# Patient Record
(demographics unavailable — no encounter records)

---

## 2025-07-02 NOTE — HISTORY OF PRESENT ILLNESS
[Patient reported mammogram was normal] : Patient reported mammogram was normal [Patient reported PAP Smear was normal] : Patient reported PAP Smear was normal [Condoms] : uses condoms [Y] : Patient is sexually active [Monogamous (Male Partner)] : is monogamous with a male partner [FreeTextEntry1] : 41 yo P2 here for annual  h/o ascus and didelphys uterus lmp 6/27 menses normal, not heavy  h/o ivf, sexually active with , condoms no hot flashes or night sweats no gyn complaints [Mammogramdate] : 10/2024 [PapSmeardate] : 2024 [LMPDate] : 6/27/25

## 2025-07-02 NOTE — DISCUSSION/SUMMARY
[FreeTextEntry1] : 40 yo  here for annual gyn visit - HCM pap collected. rx mammo given. condoms. SBE. diet and exercise f/u 1 year

## 2025-07-02 NOTE — PHYSICAL EXAM
[TextEntry] : General appearance well-appearing no acute distress  Breast examined in the upright and supine position.  Breast exam within normal limits, no masses no lymphadenopathy nontender bilaterally  Normal external genitalia  Speculum inserted normal-appearing vagina no lesions no abnormal discharge cervix appears closed no lesions. Pap collected  Bimanual exam performed. Anteverted slightly enlarged uterus, no discrete masses, nontender no cervical motion tenderness no adnexal masses bilaterally, no adnexal tenderness bilaterally

## 2025-07-07 NOTE — HISTORY OF PRESENT ILLNESS
[FreeTextEntry1] : CPE [de-identified] : 40 y/o F PMHX of Hashimoto's thyroiditis presents for yearly physical. Feels well. No acute complaints today.   Recent F/u with OB/GYN on 7/2/2025 where she had Pap smear done. Still Awaiting results. Otherwise UTD on all screening exams

## 2025-07-07 NOTE — ASSESSMENT
[Vaccines Reviewed] : Immunizations reviewed today. Please see immunization details in the vaccine log within the immunization flowsheet.  [FreeTextEntry1] : 42 y/o F PMHX of Hashimoto's thyroiditis presents for yearly physical.  #HCM - Vitals WNL. EKG NSR - Screenings UTD. Gyn appointment on 7/2. Pap Done 7/2/2025 and Mammogram done 10/2024 WNL - PHQ negative - TDAP in 2018. UTD - Routine Lab work: CBC, CMP, Lipids, A1c, TSH  #Hashimoto's - Stable on 100 mcg dose synthroid - f/u TSH reflex T4 - States she wishes to see what her TSH results are before refilling her Synthroid.

## 2025-07-07 NOTE — HEALTH RISK ASSESSMENT
[Yes] : Yes [2 - 4 times a month (2 pts)] : 2-4 times a month (2 points) [1 or 2 (0 pts)] : 1 or 2 (0 points) [Never (0 pts)] : Never (0 points) [No] : In the past 12 months have you used drugs other than those required for medical reasons? No [No falls in past year] : Patient reported no falls in the past year [0] : 2) Feeling down, depressed, or hopeless: Not at all (0) [PHQ-2 Negative - No further assessment needed] : PHQ-2 Negative - No further assessment needed [Never] : Never [Patient reported mammogram was normal] : Patient reported mammogram was normal [With Family] : lives with family [] :  [Sexually Active] : sexually active [Feels Safe at Home] : Feels safe at home [Fully functional (bathing, dressing, toileting, transferring, walking, feeding)] : Fully functional (bathing, dressing, toileting, transferring, walking, feeding) [Fully functional (using the telephone, shopping, preparing meals, housekeeping, doing laundry, using] : Fully functional and needs no help or supervision to perform IADLs (using the telephone, shopping, preparing meals, housekeeping, doing laundry, using transportation, managing medications and managing finances) [Very Good] : ~his/her~  mood as very good [Patient reported PAP Smear was normal] : Patient reported PAP Smear was normal [Employed] : employed [Seat Belt] :  uses seat belt [Patient/Caregiver not ready to engage] : , patient/caregiver not ready to engage [Audit-CScore] : 2 [de-identified] : Walking 20-25 minutes 3-4x weekly, Yoga 1x weekly for 1 hour plus 10 minutes every day [de-identified] : Vegetarian. lots of veggies and fruits.Beans, lentils. Rarely eats take out. Mostly home cooked [[id number]] : 0 [Change in mental status noted] : No change in mental status noted [Language] : denies difficulty with language [Behavior] : denies difficulty with behavior [Reasoning] : denies difficulty with reasoning [High Risk Behavior] : no high risk behavior [Reports changes in hearing] : Reports no changes in hearing [Reports changes in vision] : Reports no changes in vision [MammogramDate] : 10/2024 [PapSmearDate] : 7/2025 [PapSmearComments] : Still waiting on results. last pap 1 year ago WNL. 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

## 2025-07-07 NOTE — HEALTH RISK ASSESSMENT
[Yes] : Yes [2 - 4 times a month (2 pts)] : 2-4 times a month (2 points) [1 or 2 (0 pts)] : 1 or 2 (0 points) [Never (0 pts)] : Never (0 points) [No] : In the past 12 months have you used drugs other than those required for medical reasons? No [No falls in past year] : Patient reported no falls in the past year [0] : 2) Feeling down, depressed, or hopeless: Not at all (0) [PHQ-2 Negative - No further assessment needed] : PHQ-2 Negative - No further assessment needed [Never] : Never [Patient reported mammogram was normal] : Patient reported mammogram was normal [With Family] : lives with family [] :  [Sexually Active] : sexually active [Feels Safe at Home] : Feels safe at home [Fully functional (bathing, dressing, toileting, transferring, walking, feeding)] : Fully functional (bathing, dressing, toileting, transferring, walking, feeding) [Fully functional (using the telephone, shopping, preparing meals, housekeeping, doing laundry, using] : Fully functional and needs no help or supervision to perform IADLs (using the telephone, shopping, preparing meals, housekeeping, doing laundry, using transportation, managing medications and managing finances) [Very Good] : ~his/her~  mood as very good [Patient reported PAP Smear was normal] : Patient reported PAP Smear was normal [Employed] : employed [Seat Belt] :  uses seat belt [Patient/Caregiver not ready to engage] : , patient/caregiver not ready to engage [Audit-CScore] : 2 [de-identified] : Walking 20-25 minutes 3-4x weekly, Yoga 1x weekly for 1 hour plus 10 minutes every day [de-identified] : Vegetarian. lots of veggies and fruits.Beans, lentils. Rarely eats take out. Mostly home cooked [[id number]] : 0 [Change in mental status noted] : No change in mental status noted [Language] : denies difficulty with language [Behavior] : denies difficulty with behavior [Reasoning] : denies difficulty with reasoning [High Risk Behavior] : no high risk behavior [Reports changes in hearing] : Reports no changes in hearing [Reports changes in vision] : Reports no changes in vision [MammogramDate] : 10/2024 [PapSmearDate] : 7/2025 [PapSmearComments] : Still waiting on results. last pap 1 year ago WNL. 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

## 2025-07-07 NOTE — HISTORY OF PRESENT ILLNESS
[FreeTextEntry1] : CPE [de-identified] : 40 y/o F PMHX of Hashimoto's thyroiditis presents for yearly physical. Feels well. No acute complaints today.   Recent F/u with OB/GYN on 7/2/2025 where she had Pap smear done. Still Awaiting results. Otherwise UTD on all screening exams

## 2025-07-07 NOTE — ASSESSMENT
[Vaccines Reviewed] : Immunizations reviewed today. Please see immunization details in the vaccine log within the immunization flowsheet.  [FreeTextEntry1] : 40 y/o F PMHX of Hashimoto's thyroiditis presents for yearly physical.  #HCM - Vitals WNL. EKG NSR - Screenings UTD. Gyn appointment on 7/2. Pap Done 7/2/2025 and Mammogram done 10/2024 WNL - PHQ negative - TDAP in 2018. UTD - Routine Lab work: CBC, CMP, Lipids, A1c, TSH  #Hashimoto's - Stable on 100 mcg dose synthroid - f/u TSH reflex T4 - States she wishes to see what her TSH results are before refilling her Synthroid.

## 2025-07-07 NOTE — PHYSICAL EXAM
[No Acute Distress] : no acute distress [Well Nourished] : well nourished [Well Developed] : well developed [Well-Appearing] : well-appearing [Normal Sclera/Conjunctiva] : normal sclera/conjunctiva [PERRL] : pupils equal round and reactive to light [EOMI] : extraocular movements intact [Normal Outer Ear/Nose] : the outer ears and nose were normal in appearance [Normal Oropharynx] : the oropharynx was normal [No JVD] : no jugular venous distention [No Lymphadenopathy] : no lymphadenopathy [Supple] : supple [Thyroid Normal, No Nodules] : the thyroid was normal and there were no nodules present [No Respiratory Distress] : no respiratory distress  [No Accessory Muscle Use] : no accessory muscle use [Clear to Auscultation] : lungs were clear to auscultation bilaterally [Normal Rate] : normal rate  [Regular Rhythm] : with a regular rhythm [Normal S1, S2] : normal S1 and S2 [No Murmur] : no murmur heard [Pedal Pulses Present] : the pedal pulses are present [No Edema] : there was no peripheral edema [No Palpable Aorta] : no palpable aorta [No Extremity Clubbing/Cyanosis] : no extremity clubbing/cyanosis [Soft] : abdomen soft [Non Tender] : non-tender [Non-distended] : non-distended [No Masses] : no abdominal mass palpated [No HSM] : no HSM [Normal Bowel Sounds] : normal bowel sounds [Normal Posterior Cervical Nodes] : no posterior cervical lymphadenopathy [Normal Anterior Cervical Nodes] : no anterior cervical lymphadenopathy [No CVA Tenderness] : no CVA  tenderness [No Spinal Tenderness] : no spinal tenderness [No Joint Swelling] : no joint swelling [Grossly Normal Strength/Tone] : grossly normal strength/tone [No Rash] : no rash [Coordination Grossly Intact] : coordination grossly intact [No Focal Deficits] : no focal deficits [Normal Gait] : normal gait [Deep Tendon Reflexes (DTR)] : deep tendon reflexes were 2+ and symmetric [Normal Affect] : the affect was normal [Normal Insight/Judgement] : insight and judgment were intact